# Patient Record
Sex: FEMALE | Race: WHITE | NOT HISPANIC OR LATINO | Employment: UNEMPLOYED | URBAN - NONMETROPOLITAN AREA
[De-identification: names, ages, dates, MRNs, and addresses within clinical notes are randomized per-mention and may not be internally consistent; named-entity substitution may affect disease eponyms.]

---

## 2019-10-16 ENCOUNTER — APPOINTMENT (OUTPATIENT)
Dept: PHARMACY | Facility: HOSPITAL | Age: 29
End: 2019-10-16

## 2019-11-06 ENCOUNTER — OFFICE VISIT (OUTPATIENT)
Dept: PHARMACY | Facility: HOSPITAL | Age: 29
End: 2019-11-06

## 2019-11-06 VITALS
HEIGHT: 63 IN | DIASTOLIC BLOOD PRESSURE: 86 MMHG | WEIGHT: 220 LBS | BODY MASS INDEX: 38.98 KG/M2 | SYSTOLIC BLOOD PRESSURE: 120 MMHG | HEART RATE: 77 BPM | OXYGEN SATURATION: 98 %

## 2019-11-06 DIAGNOSIS — B18.2 CHRONIC HEPATITIS C WITHOUT HEPATIC COMA (HCC): Primary | ICD-10-CM

## 2019-11-06 DIAGNOSIS — F19.91 HISTORY OF DRUG USE: ICD-10-CM

## 2019-11-06 LAB
ALBUMIN SERPL-MCNC: 4.4 G/DL (ref 3.5–5.2)
ALBUMIN/GLOB SERPL: 1.1 G/DL
ALP SERPL-CCNC: 107 U/L (ref 39–117)
ALPHA-FETOPROTEIN: 2.26 NG/ML (ref 0–8.3)
ALT SERPL W P-5'-P-CCNC: 55 U/L (ref 1–33)
ANION GAP SERPL CALCULATED.3IONS-SCNC: 12.6 MMOL/L (ref 5–15)
AST SERPL-CCNC: 28 U/L (ref 1–32)
BILIRUB SERPL-MCNC: 0.3 MG/DL (ref 0.2–1.2)
BUN BLD-MCNC: 12 MG/DL (ref 6–20)
BUN/CREAT SERPL: 15.4 (ref 7–25)
CALCIUM SPEC-SCNC: 9.9 MG/DL (ref 8.6–10.5)
CHLORIDE SERPL-SCNC: 104 MMOL/L (ref 98–107)
CO2 SERPL-SCNC: 22.4 MMOL/L (ref 22–29)
CREAT BLD-MCNC: 0.78 MG/DL (ref 0.57–1)
DEPRECATED RDW RBC AUTO: 42.9 FL (ref 37–54)
ERYTHROCYTE [DISTWIDTH] IN BLOOD BY AUTOMATED COUNT: 12.2 % (ref 12.3–15.4)
GFR SERPL CREATININE-BSD FRML MDRD: 87 ML/MIN/1.73
GLOBULIN UR ELPH-MCNC: 4.1 GM/DL
GLUCOSE BLD-MCNC: 109 MG/DL (ref 65–99)
HBV SURFACE AB SER RIA-ACNC: REACTIVE
HCG SERPL QL: NEGATIVE
HCT VFR BLD AUTO: 42 % (ref 34–46.6)
HGB BLD-MCNC: 14.4 G/DL (ref 12–15.9)
HIV1+2 AB SER QL: NORMAL
INR PPP: 0.91 (ref 0.9–1.1)
MCH RBC QN AUTO: 32.2 PG (ref 26.6–33)
MCHC RBC AUTO-ENTMCNC: 34.3 G/DL (ref 31.5–35.7)
MCV RBC AUTO: 94 FL (ref 79–97)
PLATELET # BLD AUTO: 252 10*3/MM3 (ref 140–450)
PMV BLD AUTO: 10.3 FL (ref 6–12)
POTASSIUM BLD-SCNC: 4.4 MMOL/L (ref 3.5–5.2)
PROT SERPL-MCNC: 8.5 G/DL (ref 6–8.5)
PROTHROMBIN TIME: 12.7 SECONDS (ref 11–15.4)
RBC # BLD AUTO: 4.47 10*6/MM3 (ref 3.77–5.28)
SODIUM BLD-SCNC: 139 MMOL/L (ref 136–145)
TSH SERPL DL<=0.05 MIU/L-ACNC: 1.9 UIU/ML (ref 0.27–4.2)
WBC NRBC COR # BLD: 8.1 10*3/MM3 (ref 3.4–10.8)

## 2019-11-06 PROCEDURE — 86706 HEP B SURFACE ANTIBODY: CPT

## 2019-11-06 PROCEDURE — 82105 ALPHA-FETOPROTEIN SERUM: CPT

## 2019-11-06 PROCEDURE — 80307 DRUG TEST PRSMV CHEM ANLYZR: CPT

## 2019-11-06 PROCEDURE — 86708 HEPATITIS A ANTIBODY: CPT

## 2019-11-06 PROCEDURE — 81596 NFCT DS CHRNC HCV 6 ASSAYS: CPT

## 2019-11-06 PROCEDURE — G0432 EIA HIV-1/HIV-2 SCREEN: HCPCS

## 2019-11-06 PROCEDURE — 99243 OFF/OP CNSLTJ NEW/EST LOW 30: CPT | Performed by: PHYSICIAN ASSISTANT

## 2019-11-06 PROCEDURE — 80050 GENERAL HEALTH PANEL: CPT

## 2019-11-06 PROCEDURE — 87522 HEPATITIS C REVRS TRNSCRPJ: CPT

## 2019-11-06 PROCEDURE — 36415 COLL VENOUS BLD VENIPUNCTURE: CPT

## 2019-11-06 PROCEDURE — 87902 NFCT AGT GNTYP ALYS HEP C: CPT

## 2019-11-06 PROCEDURE — 85610 PROTHROMBIN TIME: CPT

## 2019-11-06 PROCEDURE — G0483 DRUG TEST DEF 22+ CLASSES: HCPCS

## 2019-11-06 PROCEDURE — 86704 HEP B CORE ANTIBODY TOTAL: CPT

## 2019-11-06 PROCEDURE — 84703 CHORIONIC GONADOTROPIN ASSAY: CPT

## 2019-11-06 RX ORDER — BUSPIRONE HYDROCHLORIDE 5 MG/1
TABLET ORAL
Refills: 0 | COMMUNITY
Start: 2019-10-28 | End: 2019-12-11 | Stop reason: SDUPTHER

## 2019-11-06 RX ORDER — AZITHROMYCIN 250 MG/1
TABLET, FILM COATED ORAL
Refills: 0 | COMMUNITY
Start: 2019-11-05 | End: 2020-06-03

## 2019-11-06 RX ORDER — ZIPRASIDONE HYDROCHLORIDE 40 MG/1
CAPSULE ORAL
Refills: 0 | COMMUNITY
Start: 2019-10-28 | End: 2020-06-03

## 2019-11-06 RX ORDER — VENLAFAXINE 75 MG/1
TABLET ORAL
Refills: 0 | COMMUNITY
Start: 2019-10-28 | End: 2020-06-03

## 2019-11-06 RX ORDER — GENTAMICIN SULFATE 3 MG/ML
SOLUTION/ DROPS OPHTHALMIC
Refills: 0 | COMMUNITY
Start: 2019-11-05 | End: 2020-06-03

## 2019-11-06 NOTE — PROGRESS NOTES
: 1990    Chief Complaint   Patient presents with   • Hepatitis C       Portia Hoff is a 29 y.o. female who presents to the office today as a consultation from Chuy Norwood MD for evaluation of Hepatitis C.    History of Present Illness:  She has known about having Hepatitis C since approx . No prior treatment. No current drug use, started using at age 12. Has been clean for a few years now. Does admit previous IVDU. No current alcohol use, previous alcoholism, started age 14. All of her tattoos are non-professional. Her sister has Hep C as well. She does not have any other family history of liver disease, no other personal history of liver disease. She has not had any recent liver imaging.    Review of Systems   Constitutional: Negative for chills, fatigue and fever.   HENT: Negative for trouble swallowing.    Eyes: Negative.    Respiratory: Negative for cough, choking, chest tightness and shortness of breath.    Cardiovascular: Negative for chest pain.   Gastrointestinal: Positive for abdominal distention, diarrhea, nausea and vomiting. Negative for abdominal pain, anal bleeding, blood in stool and constipation.   Endocrine: Negative.    Genitourinary: Negative for difficulty urinating.   Musculoskeletal: Positive for back pain. Negative for neck pain.   Skin: Negative for color change, pallor, rash and wound.   Allergic/Immunologic: Positive for food allergies. Negative for environmental allergies.   Neurological: Positive for headaches. Negative for dizziness and light-headedness.   Hematological: Bruises/bleeds easily.   Psychiatric/Behavioral: Negative.        Past Medical History:   Diagnosis Date   • Anxiety    • Bipolar affective (CMS/HCC)    • Infectious viral hepatitis    • Multiple personalities (CMS/HCC)    • Schizophrenia (CMS/HCC)        Past Surgical History:   Procedure Laterality Date   • LIVER BIOPSY     • UPPER GASTROINTESTINAL ENDOSCOPY         Family History   Problem Relation Age  "of Onset   • Cervical cancer Mother    • Hepatitis Sister    • Cervical cancer Sister        Social History     Socioeconomic History   • Marital status: Unknown     Spouse name: Not on file   • Number of children: Not on file   • Years of education: Not on file   • Highest education level: Not on file   Tobacco Use   • Smoking status: Former Smoker   • Smokeless tobacco: Never Used   Substance and Sexual Activity   • Alcohol use: No     Frequency: Never   • Drug use: No   • Sexual activity: Defer       Current Outpatient Medications:   •  azithromycin (ZITHROMAX) 250 MG tablet, , Disp: , Rfl: 0  •  busPIRone (BUSPAR) 5 MG tablet, , Disp: , Rfl: 0  •  CHANTIX STARTING MONTH ROGER 0.5 MG X 11 & 1 MG X 42 tablet, USE AS DIRECTED PER PACKAGE INSERT, Disp: , Rfl: 0  •  gentamicin (GARAMYCIN) 0.3 % ophthalmic solution, , Disp: , Rfl: 0  •  venlafaxine (EFFEXOR) 75 MG tablet, , Disp: , Rfl: 0  •  ziprasidone (GEODON) 40 MG capsule, , Disp: , Rfl: 0    Allergies:   Mushroom; Shellfish-derived products; Amoxicillin; Flagyl [metronidazole]; Latex; Penicillins; and Vraylar [cariprazine]    Vitals:  /86 (BP Location: Left arm, Patient Position: Sitting, Cuff Size: Adult)   Pulse 77   Ht 160 cm (63\")   Wt 99.8 kg (220 lb)   SpO2 98%   BMI 38.97 kg/m²     Physical Exam   Constitutional: She is oriented to person, place, and time. She appears well-developed and well-nourished. No distress.   Using her cell phone all throughout visit   HENT:   Head: Normocephalic and atraumatic.   Nose: Nose normal.   Mouth/Throat: Oropharynx is clear and moist.   Eyes: Conjunctivae are normal. Right eye exhibits no discharge. Left eye exhibits no discharge. No scleral icterus.   Neck: Normal range of motion. No JVD present.   Cardiovascular: Normal rate, regular rhythm and normal heart sounds. Exam reveals no gallop and no friction rub.   No murmur heard.  Pulmonary/Chest: Effort normal and breath sounds normal. No respiratory distress. " She has no wheezes. She has no rales. She exhibits no tenderness.   Abdominal: Soft. Bowel sounds are normal. She exhibits no mass. There is no tenderness.   Musculoskeletal: Normal range of motion. She exhibits no edema or deformity.   Neurological: She is alert and oriented to person, place, and time. Coordination normal.   Skin: Skin is warm and dry. No rash noted. She is not diaphoretic. No erythema.   tattoos   Psychiatric: Her behavior is normal. Judgment and thought content normal.   Depressed affect, tearful   Vitals reviewed.    Results Review:  10/2019 Labs: ALT 42, AST 31, acute hepatitis panel shows Hep C Ab pos only.    Assessment:  1. Chronic hepatitis C without hepatic coma (CMS/HCC)    2. History of drug use      Plan:  Orders Placed This Encounter   Procedures   • US Liver   • AFP Tumor Marker   • CBC (No Diff)   • Comprehensive Metabolic Panel   • hCG, Serum, Qualitative   • HCV FibroSURE   • HCV RNA By PCR, Qn Rfx Dhara   • Hepatitis A Antibody, Total   • Hepatitis B Core Antibody, Total   • Hepatitis B Surface Antibody   • HIV-1 / O / 2 Ag / Antibody 4th Generation   • Protime-INR   • Compliance Drug Analysis, Ur - Urine, Clean Catch   • TSH     More recommendations will be made after these results have been reviewed. She will continue to abstain from alcohol and illegal drugs. She will have US liver to determine if any lesions or other liver diseases present. Immunity to Hep A and B will be determined and vaccinations recommended if needed. If drug and alcohol screen negative, then we will proceed with Hep C therapy and will submit Rx to insurance company for approval. Treatment will depend on fibrosis score and Hep C genotype. When approved, she will  Rx from our pharmacy and have another appointment with me. Hep C viral load lab will be checked 4 weeks after start of therapy, at end of therapy and 3 months s/p therapy to determine response to treatment and cure. She will call with  concerns.           Return in about 2 weeks (around 11/20/2019) for discussion of results.      Electronically signed 11/6/2019 9:13 AM  Zofia Wray PA-C, [facility]

## 2019-11-07 LAB
HAV AB SER QL IA: NEGATIVE
HBV CORE AB SER DONR QL IA: NEGATIVE

## 2019-11-08 LAB
A2 MACROGLOB SERPL-MCNC: 186 MG/DL (ref 110–276)
ALT SERPL W P-5'-P-CCNC: 64 IU/L (ref 0–40)
APO A-I SERPL-MCNC: 124 MG/DL (ref 116–209)
BILIRUB SERPL-MCNC: 0.3 MG/DL (ref 0–1.2)
FIBROSIS SCORING:: ABNORMAL
FIBROSIS STAGE SERPL QL: ABNORMAL
GGT SERPL-CCNC: 176 IU/L (ref 0–60)
HAPTOGLOB SERPL-MCNC: 224 MG/DL (ref 34–200)
HCV AB SER QL: ABNORMAL
LABORATORY COMMENT REPORT: ABNORMAL
LIMITATIONS:: ABNORMAL
LIVER FIBR SCORE SERPL CALC.FIBROSURE: 0.13 (ref 0–0.21)
NECROINFLAMM ACTIVITY SCORING:: ABNORMAL
NECROINFLAMMATORY ACT GRADE SERPL QL: ABNORMAL
NECROINFLAMMATORY ACT SCORE SERPL: 0.32 (ref 0–0.17)

## 2019-11-09 LAB
HCV GENTYP SERPL NAA+PROBE: NORMAL
HCV GENTYP SERPL NAA+PROBE: NORMAL
HCV RNA SERPL NAA+PROBE-ACNC: NORMAL IU/ML
HCV RNA SERPL NAA+PROBE-LOG IU: 5.55 LOG10 IU/ML
Lab: NORMAL
REF LAB TEST REF RANGE: NORMAL

## 2019-11-11 LAB — CONV REPORT SUMMARY: NORMAL

## 2019-11-20 ENCOUNTER — HOSPITAL ENCOUNTER (OUTPATIENT)
Dept: ULTRASOUND IMAGING | Facility: HOSPITAL | Age: 29
End: 2019-11-20

## 2019-11-20 ENCOUNTER — APPOINTMENT (OUTPATIENT)
Dept: PHARMACY | Facility: HOSPITAL | Age: 29
End: 2019-11-20

## 2019-12-11 ENCOUNTER — HOSPITAL ENCOUNTER (OUTPATIENT)
Dept: ULTRASOUND IMAGING | Facility: HOSPITAL | Age: 29
Discharge: HOME | End: 2019-12-11
Admitting: PHYSICIAN ASSISTANT

## 2019-12-11 ENCOUNTER — OFFICE VISIT (OUTPATIENT)
Dept: PHARMACY | Facility: HOSPITAL | Age: 29
End: 2019-12-11

## 2019-12-11 VITALS
BODY MASS INDEX: 38.98 KG/M2 | SYSTOLIC BLOOD PRESSURE: 119 MMHG | OXYGEN SATURATION: 99 % | DIASTOLIC BLOOD PRESSURE: 84 MMHG | HEART RATE: 76 BPM | WEIGHT: 220 LBS | HEIGHT: 63 IN

## 2019-12-11 DIAGNOSIS — B18.2 CHRONIC HEPATITIS C WITHOUT HEPATIC COMA (HCC): Primary | ICD-10-CM

## 2019-12-11 DIAGNOSIS — F19.91 HISTORY OF DRUG USE: ICD-10-CM

## 2019-12-11 DIAGNOSIS — B18.2 CHRONIC HEPATITIS C WITHOUT HEPATIC COMA (HCC): ICD-10-CM

## 2019-12-11 DIAGNOSIS — Z23 NEED FOR HEPATITIS A IMMUNIZATION: ICD-10-CM

## 2019-12-11 PROCEDURE — 76705 ECHO EXAM OF ABDOMEN: CPT

## 2019-12-11 PROCEDURE — 76705 ECHO EXAM OF ABDOMEN: CPT | Performed by: RADIOLOGY

## 2019-12-11 PROCEDURE — 99214 OFFICE O/P EST MOD 30 MIN: CPT | Performed by: PHYSICIAN ASSISTANT

## 2019-12-11 RX ORDER — CYCLOBENZAPRINE HCL 10 MG
TABLET ORAL
Refills: 0 | COMMUNITY
Start: 2019-12-02 | End: 2020-06-03

## 2019-12-11 RX ORDER — BUSPIRONE HYDROCHLORIDE 10 MG/1
TABLET ORAL
Refills: 0 | COMMUNITY
Start: 2019-12-05 | End: 2020-06-03

## 2019-12-11 NOTE — PROGRESS NOTES
: 1990    Chief Complaint   Patient presents with   • Hepatitis C       Portia Hoff is a 29 y.o. female who presents to the office today as a follow up appointment regarding Hepatitis C.    History of Present Illness:  She would like to discuss her recent results. She has known about having Hepatitis C since approx . No prior treatment. No current drug use, started using at age 12. Has been clean for a few years now. Does admit previous IVDU. No current alcohol use, previous alcoholism, started age 14. All of her tattoos are non-professional. Her sister has Hep C as well. She does not have any other family history of liver disease, no other personal history of liver disease.     Review of Systems   Constitutional: Negative for chills, fatigue and fever.   HENT: Negative for trouble swallowing.    Eyes: Negative.    Respiratory: Negative for cough, choking, chest tightness and shortness of breath.    Cardiovascular: Negative for chest pain.   Gastrointestinal: Negative for abdominal distention, abdominal pain, anal bleeding, blood in stool, constipation, diarrhea, nausea and vomiting.   Endocrine: Negative.    Genitourinary: Negative for difficulty urinating.   Musculoskeletal: Positive for back pain. Negative for neck pain.   Skin: Negative for color change, pallor, rash and wound.   Allergic/Immunologic: Positive for food allergies. Negative for environmental allergies.   Neurological: Positive for headaches. Negative for dizziness and light-headedness.   Hematological: Bruises/bleeds easily.   Psychiatric/Behavioral: Negative.      I have reviewed the ROS as documented by the MA/LPN/RN Zofia Wray PA-C    Past Medical History:   Diagnosis Date   • Anxiety    • Bipolar affective (CMS/HCC)    • Infectious viral hepatitis    • Multiple personalities (CMS/HCC)    • Schizophrenia (CMS/HCC)        Past Surgical History:   Procedure Laterality Date   • LIVER BIOPSY     • UPPER GASTROINTESTINAL ENDOSCOPY    "      Family History   Problem Relation Age of Onset   • Cervical cancer Mother    • Hepatitis Sister    • Cervical cancer Sister        Social History     Socioeconomic History   • Marital status: Unknown     Spouse name: Not on file   • Number of children: Not on file   • Years of education: Not on file   • Highest education level: Not on file   Tobacco Use   • Smoking status: Former Smoker   • Smokeless tobacco: Never Used   Substance and Sexual Activity   • Alcohol use: No     Frequency: Never   • Drug use: No   • Sexual activity: Defer       Current Outpatient Medications:   •  azithromycin (ZITHROMAX) 250 MG tablet, , Disp: , Rfl: 0  •  busPIRone (BUSPAR) 10 MG tablet, , Disp: , Rfl: 0  •  CHANTIX STARTING MONTH ROGER 0.5 MG X 11 & 1 MG X 42 tablet, USE AS DIRECTED PER PACKAGE INSERT, Disp: , Rfl: 0  •  gentamicin (GARAMYCIN) 0.3 % ophthalmic solution, , Disp: , Rfl: 0  •  venlafaxine (EFFEXOR) 75 MG tablet, , Disp: , Rfl: 0  •  ziprasidone (GEODON) 40 MG capsule, , Disp: , Rfl: 0  •  cyclobenzaprine (FLEXERIL) 10 MG tablet, , Disp: , Rfl: 0    Allergies:   Mushroom; Shellfish-derived products; Amoxicillin; Flagyl [metronidazole]; Latex; Penicillins; and Vraylar [cariprazine]    Vitals:  /84   Pulse 76   Ht 160 cm (63\")   Wt 99.8 kg (220 lb)   SpO2 99%   BMI 38.97 kg/m²     Physical Exam   Constitutional: She is oriented to person, place, and time. She appears well-developed and well-nourished. No distress.   HENT:   Head: Normocephalic and atraumatic.   Nose: Nose normal.   Mouth/Throat: Oropharynx is clear and moist.   Eyes: Conjunctivae are normal. Right eye exhibits no discharge. Left eye exhibits no discharge. No scleral icterus.   Neck: Normal range of motion. No JVD present.   Cardiovascular: Normal rate, regular rhythm and normal heart sounds. Exam reveals no gallop and no friction rub.   No murmur heard.  Pulmonary/Chest: Effort normal and breath sounds normal. No respiratory distress. She " has no wheezes. She has no rales. She exhibits no tenderness.   Abdominal: Soft. Bowel sounds are normal. She exhibits no mass. There is no tenderness.   Musculoskeletal: Normal range of motion. She exhibits no edema or deformity.   Neurological: She is alert and oriented to person, place, and time. Coordination normal.   Skin: Skin is warm and dry. No rash noted. She is not diaphoretic. No erythema.   tattoos   Psychiatric: She has a normal mood and affect. Her behavior is normal. Judgment and thought content normal.   Mood improved   Vitals reviewed.    Results Review:  Labs 11/6/2019: Hepatitis C genotype 1a, AFP normal, CBC normal, CMP with ALT elevated 55, pregnancy negative, F0 fibrosis score, A1 inflammation, hepatitis C viral load 355,000, hepatitis A total antibody negative, hepatitis B core total antibody negative, hepatitis B surface antibody positive, HIV negative, INR normal, drug screen negative, TSH normal.      Ultrasound liver 12/11/2019 normal.    10/2019 Labs: ALT 42, AST 31, acute hepatitis panel shows Hep C Ab pos only.    Assessment:  1. Chronic hepatitis C without hepatic coma (CMS/HCC)    2. Need for hepatitis A immunization      Plan:  Rx- Mavyret, Glecaprevir-Pibrentasvir 100-MG tablet, 3 tabs p.o. once daily for 8 weeks for treatment of chronic hepatitis C infection.  She will continue to abstain from alcohol and illicit drug use.  She will return for follow-up and labs in 4 weeks for monitoring of therapy.  More labs to be completed at completion of therapy and 3-month status post completion of therapy for confirmation of cure.  She needs hepatitis A vaccination for immunity which was discussed.        Return in about 1 month (around 1/11/2020) for recheck Hep C.      Electronically signed 12/18/2019 2:14 PM  Zofia Wray PA-C, [facility]

## 2019-12-16 ENCOUNTER — SPECIALTY PHARMACY (OUTPATIENT)
Dept: PHARMACY | Facility: HOSPITAL | Age: 29
End: 2019-12-16

## 2019-12-16 ENCOUNTER — TELEPHONE (OUTPATIENT)
Dept: GASTROENTEROLOGY | Facility: CLINIC | Age: 29
End: 2019-12-16

## 2019-12-16 DIAGNOSIS — B18.2 CHRONIC HEPATITIS C WITHOUT HEPATIC COMA (HCC): ICD-10-CM

## 2019-12-16 DIAGNOSIS — B18.2 CHRONIC HEPATITIS C WITHOUT HEPATIC COMA (HCC): Primary | ICD-10-CM

## 2019-12-16 NOTE — TELEPHONE ENCOUNTER
Pt has Hepatitis C, genotype 1a, treatment naive, without cirrhosis (F0). I have prescribed Mavyret.

## 2020-01-23 ENCOUNTER — SPECIALTY PHARMACY (OUTPATIENT)
Dept: PHARMACY | Facility: HOSPITAL | Age: 30
End: 2020-01-23

## 2020-01-29 ENCOUNTER — OFFICE VISIT (OUTPATIENT)
Dept: PHARMACY | Facility: HOSPITAL | Age: 30
End: 2020-01-29

## 2020-01-29 VITALS
DIASTOLIC BLOOD PRESSURE: 88 MMHG | HEART RATE: 80 BPM | OXYGEN SATURATION: 100 % | SYSTOLIC BLOOD PRESSURE: 144 MMHG | HEIGHT: 63 IN | BODY MASS INDEX: 38.98 KG/M2 | WEIGHT: 220 LBS

## 2020-01-29 DIAGNOSIS — B18.2 HEP C W/O COMA, CHRONIC (HCC): ICD-10-CM

## 2020-01-29 DIAGNOSIS — B18.2 CHRONIC HEPATITIS C WITHOUT HEPATIC COMA (HCC): Primary | ICD-10-CM

## 2020-01-29 LAB
ALBUMIN SERPL-MCNC: 4.3 G/DL (ref 3.5–5.2)
ALBUMIN/GLOB SERPL: 1.4 G/DL
ALP SERPL-CCNC: 110 U/L (ref 39–117)
ALT SERPL W P-5'-P-CCNC: 18 U/L (ref 1–33)
ANION GAP SERPL CALCULATED.3IONS-SCNC: 15.3 MMOL/L (ref 5–15)
AST SERPL-CCNC: 26 U/L (ref 1–32)
BILIRUB SERPL-MCNC: 0.4 MG/DL (ref 0.2–1.2)
BUN BLD-MCNC: 14 MG/DL (ref 6–20)
BUN/CREAT SERPL: 17.7 (ref 7–25)
CALCIUM SPEC-SCNC: 9.3 MG/DL (ref 8.6–10.5)
CHLORIDE SERPL-SCNC: 100 MMOL/L (ref 98–107)
CO2 SERPL-SCNC: 22.7 MMOL/L (ref 22–29)
CREAT BLD-MCNC: 0.79 MG/DL (ref 0.57–1)
GFR SERPL CREATININE-BSD FRML MDRD: 85 ML/MIN/1.73
GLOBULIN UR ELPH-MCNC: 3.1 GM/DL
GLUCOSE BLD-MCNC: 116 MG/DL (ref 65–99)
POTASSIUM BLD-SCNC: 3.8 MMOL/L (ref 3.5–5.2)
PROT SERPL-MCNC: 7.4 G/DL (ref 6–8.5)
SODIUM BLD-SCNC: 138 MMOL/L (ref 136–145)

## 2020-01-29 PROCEDURE — 87522 HEPATITIS C REVRS TRNSCRPJ: CPT

## 2020-01-29 PROCEDURE — 80053 COMPREHEN METABOLIC PANEL: CPT

## 2020-01-29 PROCEDURE — 99214 OFFICE O/P EST MOD 30 MIN: CPT | Performed by: PHYSICIAN ASSISTANT

## 2020-01-29 PROCEDURE — 36415 COLL VENOUS BLD VENIPUNCTURE: CPT

## 2020-01-29 NOTE — PROGRESS NOTES
: 1990    Chief Complaint   Patient presents with   • Hepatitis C       Portia Hoff is a 29 y.o. female who presents to the office today as a follow up appointment regarding Hepatitis C.    History of Present Illness:  She has been taking Mavyret for the past 4-5 weeks now for treatment of Hepatitis C. She had some mild side effects of fatigue and dizziness at the beginning of therapy but they have resolved now. She is not using illicit drugs or drinking alcohol. She had Hepatitis A vaccination as recommended, first vaccine in series approx 6 weeks ago.     She has known about having Hepatitis C since approx . No prior treatment. Started using drugs at age 12. Has been clean for a few years now. Does admit previous IVDU. No current alcohol use, previous alcoholism, started drinking age 14. All of her tattoos are non-professional. Her sister has Hep C as well. She does not have any other family history of liver disease, no other personal history of liver disease.     Review of Systems   Constitutional: Negative for chills, fatigue and fever.   HENT: Negative for trouble swallowing.    Eyes: Negative.    Respiratory: Negative for cough, choking, chest tightness and shortness of breath.    Cardiovascular: Negative for chest pain.   Gastrointestinal: Negative for abdominal distention, abdominal pain, anal bleeding, blood in stool, constipation, diarrhea, nausea and vomiting.   Endocrine: Negative.    Genitourinary: Negative for difficulty urinating.   Musculoskeletal: Positive for back pain. Negative for neck pain.   Skin: Negative for color change, pallor, rash and wound.   Allergic/Immunologic: Positive for food allergies. Negative for environmental allergies.   Neurological: Positive for headaches. Negative for dizziness and light-headedness.   Hematological: Bruises/bleeds easily.   Psychiatric/Behavioral: Negative.      I have reviewed and confirmed the accuracy of the ROS as documented by the  "MA/GOVIND/RN Zofia Wray PA-C    Past Medical History:   Diagnosis Date   • Anxiety    • Bipolar affective (CMS/HCC)    • Infectious viral hepatitis    • Multiple personalities (CMS/HCC)    • Schizophrenia (CMS/HCC)        Past Surgical History:   Procedure Laterality Date   • LIVER BIOPSY     • UPPER GASTROINTESTINAL ENDOSCOPY         Family History   Problem Relation Age of Onset   • Cervical cancer Mother    • Hepatitis Sister    • Cervical cancer Sister        Social History     Socioeconomic History   • Marital status: Unknown     Spouse name: Not on file   • Number of children: Not on file   • Years of education: Not on file   • Highest education level: Not on file   Tobacco Use   • Smoking status: Former Smoker   • Smokeless tobacco: Never Used   Substance and Sexual Activity   • Alcohol use: No     Frequency: Never   • Drug use: No   • Sexual activity: Defer     Current Outpatient Medications:   •  azithromycin (ZITHROMAX) 250 MG tablet, , Disp: , Rfl: 0  •  busPIRone (BUSPAR) 10 MG tablet, , Disp: , Rfl: 0  •  CHANTIX STARTING MONTH ROGER 0.5 MG X 11 & 1 MG X 42 tablet, USE AS DIRECTED PER PACKAGE INSERT, Disp: , Rfl: 0  •  cyclobenzaprine (FLEXERIL) 10 MG tablet, , Disp: , Rfl: 0  •  gentamicin (GARAMYCIN) 0.3 % ophthalmic solution, , Disp: , Rfl: 0  •  Glecaprevir-Pibrentasvir (MAVYRET) 100-40 MG tablet, Take 3 tablets by mouth Daily for 56 days., Disp: 84 tablet, Rfl: 1  •  venlafaxine (EFFEXOR) 75 MG tablet, , Disp: , Rfl: 0  •  ziprasidone (GEODON) 40 MG capsule, , Disp: , Rfl: 0    Allergies:   Mushroom; Shellfish-derived products; Amoxicillin; Flagyl [metronidazole]; Latex; Penicillins; and Vraylar [cariprazine]    Vitals:  /88 (BP Location: Left arm, Patient Position: Sitting, Cuff Size: Adult)   Pulse 80   Ht 160 cm (63\")   Wt 99.8 kg (220 lb)   SpO2 100%   BMI 38.97 kg/m²     Physical Exam   Constitutional: She is oriented to person, place, and time. She appears well-developed and " well-nourished. No distress.   HENT:   Head: Normocephalic and atraumatic.   Nose: Nose normal.   Mouth/Throat: Oropharynx is clear and moist.   Eyes: Conjunctivae are normal. Right eye exhibits no discharge. Left eye exhibits no discharge. No scleral icterus.   Neck: Normal range of motion. No JVD present.   Pulmonary/Chest: Effort normal. No respiratory distress.   Musculoskeletal: Normal range of motion. She exhibits no edema or deformity.   Neurological: She is alert and oriented to person, place, and time. Coordination normal.   Skin: No rash noted. She is not diaphoretic. No erythema.   Psychiatric: She has a normal mood and affect. Her behavior is normal. Judgment and thought content normal.   Vitals reviewed.    Results Review:  Labs 11/6/2019: Hepatitis C genotype 1a, AFP normal, CBC normal, CMP with ALT elevated 55, pregnancy negative, F0 fibrosis score, A1 inflammation, hepatitis C viral load 355,000, hepatitis A total antibody negative, hepatitis B core total antibody negative, hepatitis B surface antibody positive, HIV negative, INR normal, drug screen negative, TSH normal.       Ultrasound liver 12/11/2019 normal.     10/2019 Labs: ALT 42, AST 31, acute hepatitis panel shows Hep C Ab pos only.    Assessment:  1. Chronic hepatitis C without hepatic coma (CMS/HCC)      Plan:  Orders Placed This Encounter   Procedures   • Hepatitis C RNA, Quantitative, PCR (graph)   • Comprehensive Metabolic Panel     Continue taking Mavyret, Glecaprevir-Pibrentasvir 100-MG tablet, 3 tabs p.o. once daily for total duration of therapy of 8 weeks for treatment of chronic hepatitis C infection.  She will continue to abstain from alcohol and illicit drug use.  She will return for follow-up and labs at completion of therapy and 3-month status post completion of therapy for confirmation of cure. She will be called with results.        Follow up after final labs are completed.      Electronically signed 1/29/2020 9:27 AM  Zofia  ZA Wray, [facility]

## 2020-01-31 LAB
HCV RNA SERPL NAA+PROBE-ACNC: NORMAL IU/ML
TEST INFORMATION: NORMAL

## 2020-02-03 ENCOUNTER — TELEPHONE (OUTPATIENT)
Dept: GASTROENTEROLOGY | Facility: CLINIC | Age: 30
End: 2020-02-03

## 2020-02-03 NOTE — TELEPHONE ENCOUNTER
Please let patient know that hepatitis C viral load not detected at 4-week labs and liver enzymes normal.  Continue with plan for repeat labs at completion of therapy.

## 2020-02-03 NOTE — TELEPHONE ENCOUNTER
Spoke with patient this morning and made her aware of her lab results.  Patient very pleased.  End of treatment labs are scheduled for 2/19/20.  Thanks!

## 2020-02-07 NOTE — PROGRESS NOTES
Specialty Pharmacy Note      Name:  Portia Hoff  :  1990  Date:  2020         Past Medical History:   Diagnosis Date   • Anxiety    • Bipolar affective (CMS/HCC)    • Infectious viral hepatitis    • Multiple personalities (CMS/HCC)    • Schizophrenia (CMS/HCC)        Past Surgical History:   Procedure Laterality Date   • LIVER BIOPSY     • UPPER GASTROINTESTINAL ENDOSCOPY         Social History     Socioeconomic History   • Marital status: Unknown     Spouse name: Not on file   • Number of children: Not on file   • Years of education: Not on file   • Highest education level: Not on file   Tobacco Use   • Smoking status: Former Smoker   • Smokeless tobacco: Never Used   Substance and Sexual Activity   • Alcohol use: No     Frequency: Never   • Drug use: No   • Sexual activity: Defer       Family History   Problem Relation Age of Onset   • Cervical cancer Mother    • Hepatitis Sister    • Cervical cancer Sister        Allergies   Allergen Reactions   • Mushroom Anaphylaxis   • Shellfish-Derived Products Anaphylaxis   • Amoxicillin Hives   • Flagyl [Metronidazole] Other (See Comments)     Thrush     • Latex Hives     Blisters     • Penicillins Hives   • Vraylar [Cariprazine] Other (See Comments)     Suicidal thoughts        Current Outpatient Medications   Medication Sig Dispense Refill   • azithromycin (ZITHROMAX) 250 MG tablet   0   • busPIRone (BUSPAR) 10 MG tablet   0   • CHANTIX STARTING MONTH ROGER 0.5 MG X 11 & 1 MG X 42 tablet USE AS DIRECTED PER PACKAGE INSERT  0   • cyclobenzaprine (FLEXERIL) 10 MG tablet   0   • gentamicin (GARAMYCIN) 0.3 % ophthalmic solution   0   • Glecaprevir-Pibrentasvir (MAVYRET) 100-40 MG tablet Take 3 tablets by mouth Daily for 56 days. 84 tablet 1   • venlafaxine (EFFEXOR) 75 MG tablet   0   • ziprasidone (GEODON) 40 MG capsule   0     No current facility-administered medications for this visit.          LABORATORY:    Lab Results   Component Value Date    TSH  1.900 11/06/2019    PROTIME 12.7 11/06/2019    INR 0.91 11/06/2019     Lab Results   Component Value Date    PROTIME 12.7 11/06/2019    INR 0.91 11/06/2019     Lab Results   Component Value Date    HAV Negative 11/06/2019    HCVQUANT 044901 11/06/2019    XXOPOX44 5.550 11/06/2019    HCVINFO Comment 11/06/2019    HCVGENOTYPE Comment 11/06/2019    HCVGENOTYPE 1a 11/06/2019     Lab Results   Component Value Date    HEPBSAB Reactive (A) 11/06/2019     Last Urine Toxicity     There is no flowsheet data to display.          ASSESSMENT/PLAN:    Patient Update Assessment (new medications, allergies, medical history): No changes.     Medication(s): Mavyret 100-40 mg tablets for treatment of Hepatitis C.     Currently Taking Medication(s): Patient reports taking medication as directed, 3 tablets by mouth daily.    Effectiveness of Medication: Will be determined with 4 week, end of treatment, and 3 month s/p treatment labs as ordered by patient's provider. (See Labs tab in patient chart)     Experiencing Side Effects: None expressed, patient tolerating well.    Prior Authorization Status: Approved.     Financial Assistance Status: Assistance is not needed at this time.    Any Issues Identified: No issues expressed from patient, no issues processing medication refill.     Appropriate to Process Prescription(s): Yes.  Medication will be dispensed from [facility] and delivered to patient via [employer] overnight delivery services.     Counseling Offered: Patient was counseled with initial fill.  She is aware to contact pharmacy and/or clinic with any new questions or concerns.     Next Specialty Pharmacy Visit: Specialty visit will not be needed due to this refill being the last four weeks of an eight week medication regimen.  Specialty Pharmacy can follow as needed.

## 2020-02-19 ENCOUNTER — LAB (OUTPATIENT)
Dept: PHARMACY | Facility: HOSPITAL | Age: 30
End: 2020-02-19

## 2020-02-19 DIAGNOSIS — B18.2 CHRONIC HEPATITIS C WITHOUT HEPATIC COMA (HCC): ICD-10-CM

## 2020-02-19 PROCEDURE — 87522 HEPATITIS C REVRS TRNSCRPJ: CPT

## 2020-02-19 PROCEDURE — 80053 COMPREHEN METABOLIC PANEL: CPT

## 2020-02-19 PROCEDURE — 36415 COLL VENOUS BLD VENIPUNCTURE: CPT

## 2020-02-20 LAB
ALBUMIN SERPL-MCNC: 4.5 G/DL (ref 3.5–5.2)
ALBUMIN/GLOB SERPL: 1.4 G/DL
ALP SERPL-CCNC: 105 U/L (ref 39–117)
ALT SERPL W P-5'-P-CCNC: 16 U/L (ref 1–33)
ANION GAP SERPL CALCULATED.3IONS-SCNC: 10.7 MMOL/L (ref 5–15)
AST SERPL-CCNC: 18 U/L (ref 1–32)
BILIRUB SERPL-MCNC: 0.3 MG/DL (ref 0.2–1.2)
BUN BLD-MCNC: 13 MG/DL (ref 6–20)
BUN/CREAT SERPL: 14.8 (ref 7–25)
CALCIUM SPEC-SCNC: 9.3 MG/DL (ref 8.6–10.5)
CHLORIDE SERPL-SCNC: 101 MMOL/L (ref 98–107)
CO2 SERPL-SCNC: 24.3 MMOL/L (ref 22–29)
CREAT BLD-MCNC: 0.88 MG/DL (ref 0.57–1)
GFR SERPL CREATININE-BSD FRML MDRD: 75 ML/MIN/1.73
GLOBULIN UR ELPH-MCNC: 3.2 GM/DL
GLUCOSE BLD-MCNC: 133 MG/DL (ref 65–99)
POTASSIUM BLD-SCNC: 4.1 MMOL/L (ref 3.5–5.2)
PROT SERPL-MCNC: 7.7 G/DL (ref 6–8.5)
SODIUM BLD-SCNC: 136 MMOL/L (ref 136–145)

## 2020-02-21 LAB
HCV RNA SERPL NAA+PROBE-ACNC: NORMAL IU/ML
TEST INFORMATION: NORMAL

## 2020-02-24 ENCOUNTER — TELEPHONE (OUTPATIENT)
Dept: GASTROENTEROLOGY | Facility: CLINIC | Age: 30
End: 2020-02-24

## 2020-02-24 NOTE — TELEPHONE ENCOUNTER
Pt's end of tx labs show HCV not detected and liver enzymes normal. Please let her know. Continue with plan to have labs again 3 mo s/p completion of therapy then f/u 1 week after.

## 2020-04-20 NOTE — PROGRESS NOTES
Specialty Pharmacy Note      Name:  Portia Hoff  :  1990  Date:  2019         Past Medical History:   Diagnosis Date   • Anxiety    • Bipolar affective (CMS/HCC)    • Infectious viral hepatitis    • Multiple personalities (CMS/HCC)    • Schizophrenia (CMS/HCC)        Past Surgical History:   Procedure Laterality Date   • LIVER BIOPSY     • UPPER GASTROINTESTINAL ENDOSCOPY         Social History     Socioeconomic History   • Marital status: Unknown     Spouse name: Not on file   • Number of children: Not on file   • Years of education: Not on file   • Highest education level: Not on file   Tobacco Use   • Smoking status: Former Smoker   • Smokeless tobacco: Never Used   Substance and Sexual Activity   • Alcohol use: No     Frequency: Never   • Drug use: No   • Sexual activity: Defer       Family History   Problem Relation Age of Onset   • Cervical cancer Mother    • Hepatitis Sister    • Cervical cancer Sister        Allergies   Allergen Reactions   • Mushroom Anaphylaxis   • Shellfish-Derived Products Anaphylaxis   • Amoxicillin Hives   • Flagyl [Metronidazole] Other (See Comments)     Thrush     • Latex Hives     Blisters     • Penicillins Hives   • Vraylar [Cariprazine] Other (See Comments)     Suicidal thoughts        Current Outpatient Medications   Medication Sig Dispense Refill   • azithromycin (ZITHROMAX) 250 MG tablet   0   • busPIRone (BUSPAR) 10 MG tablet   0   • CHANTIX STARTING MONTH ROGER 0.5 MG X 11 & 1 MG X 42 tablet USE AS DIRECTED PER PACKAGE INSERT  0   • cyclobenzaprine (FLEXERIL) 10 MG tablet   0   • gentamicin (GARAMYCIN) 0.3 % ophthalmic solution   0   • venlafaxine (EFFEXOR) 75 MG tablet   0   • ziprasidone (GEODON) 40 MG capsule   0     No current facility-administered medications for this visit.          LABORATORY:    Lab Results   Component Value Date    TSH 1.900 2019    PROTIME 12.7 2019    INR 0.91 2019     Lab Results   Component Value Date     PROTIME 12.7 11/06/2019    INR 0.91 11/06/2019     Lab Results   Component Value Date    HAV Negative 11/06/2019    HCVQUANT 672507 11/06/2019    SANIXU30 5.550 11/06/2019    HCVINFO Comment 11/06/2019    HCVGENOTYPE Comment 11/06/2019    HCVGENOTYPE 1a 11/06/2019     Lab Results   Component Value Date    HEPBSAB Reactive (A) 11/06/2019     Last Urine Toxicity     There is no flowsheet data to display.          ASSESSMENT/PLAN:    Patient Update Assessment (new medications, allergies, medical history): Updated     Medication(s): Mavyret for 8 weeks for treatment of Hepatitis C     Currently Taking Medication(s): New Start     Effectiveness of Medication: N/A     Experiencing Side Effects: N/A     Prior Authorization Status: Was obtained with insurance     Financial Assistance Status: None needed at this time     Any Issues Identified: None     Appropriate to Process Prescription(s): Yes, after chart review and patient discussion, assessment was ok to process at [facility] pharmacy.     Counseling Offered: Yes, patient was counseled using medication guide focusing on administration instructions, importance of adherence, common/serious side effects, etc.

## 2020-05-27 ENCOUNTER — LAB (OUTPATIENT)
Dept: PHARMACY | Facility: HOSPITAL | Age: 30
End: 2020-05-27

## 2020-05-27 DIAGNOSIS — B18.2 CHRONIC HEPATITIS C WITHOUT HEPATIC COMA (HCC): Primary | ICD-10-CM

## 2020-05-27 PROCEDURE — 87522 HEPATITIS C REVRS TRNSCRPJ: CPT

## 2020-05-27 PROCEDURE — 80053 COMPREHEN METABOLIC PANEL: CPT

## 2020-05-27 PROCEDURE — 36415 COLL VENOUS BLD VENIPUNCTURE: CPT

## 2020-05-28 LAB
ALBUMIN SERPL-MCNC: 4.5 G/DL (ref 3.5–5.2)
ALBUMIN/GLOB SERPL: 1.3 G/DL
ALP SERPL-CCNC: 84 U/L (ref 39–117)
ALT SERPL W P-5'-P-CCNC: 26 U/L (ref 1–33)
ANION GAP SERPL CALCULATED.3IONS-SCNC: 14.7 MMOL/L (ref 5–15)
AST SERPL-CCNC: 21 U/L (ref 1–32)
BILIRUB SERPL-MCNC: 0.4 MG/DL (ref 0.2–1.2)
BUN BLD-MCNC: 9 MG/DL (ref 6–20)
BUN/CREAT SERPL: 9.4 (ref 7–25)
CALCIUM SPEC-SCNC: 10 MG/DL (ref 8.6–10.5)
CHLORIDE SERPL-SCNC: 100 MMOL/L (ref 98–107)
CO2 SERPL-SCNC: 23.3 MMOL/L (ref 22–29)
CREAT BLD-MCNC: 0.96 MG/DL (ref 0.57–1)
GFR SERPL CREATININE-BSD FRML MDRD: 68 ML/MIN/1.73
GLOBULIN UR ELPH-MCNC: 3.4 GM/DL
GLUCOSE BLD-MCNC: 111 MG/DL (ref 65–99)
POTASSIUM BLD-SCNC: 4.2 MMOL/L (ref 3.5–5.2)
PROT SERPL-MCNC: 7.9 G/DL (ref 6–8.5)
SODIUM BLD-SCNC: 138 MMOL/L (ref 136–145)

## 2020-06-03 ENCOUNTER — OFFICE VISIT (OUTPATIENT)
Dept: PHARMACY | Facility: HOSPITAL | Age: 30
End: 2020-06-03

## 2020-06-03 VITALS
HEIGHT: 63 IN | WEIGHT: 220 LBS | DIASTOLIC BLOOD PRESSURE: 80 MMHG | HEART RATE: 87 BPM | OXYGEN SATURATION: 96 % | BODY MASS INDEX: 38.98 KG/M2 | SYSTOLIC BLOOD PRESSURE: 123 MMHG

## 2020-06-03 DIAGNOSIS — Z86.19 HISTORY OF HEPATITIS C: Primary | ICD-10-CM

## 2020-06-03 LAB
HCV RNA SERPL NAA+PROBE-ACNC: NORMAL IU/ML
TEST INFORMATION: NORMAL

## 2020-06-03 PROCEDURE — 99213 OFFICE O/P EST LOW 20 MIN: CPT | Performed by: PHYSICIAN ASSISTANT

## 2020-06-03 RX ORDER — TRAZODONE HYDROCHLORIDE 50 MG/1
TABLET ORAL
COMMUNITY
Start: 2020-03-19

## 2020-06-03 NOTE — PROGRESS NOTES
: 1990    Chief Complaint   Patient presents with   • Hepatitis C       Portia Hoff is a 29 y.o. female who presents to the office today as a follow up appointment regarding Hepatitis C.    History of Present Illness:  Feeling great, no GI complaints today. She would like to discuss her recent lab results. She completed Hepatitis C therapy with Mavyret as prescribed for 8 weeks, completed treatment just over 3 months ago.     She is not using illicit drugs. Did drink alcohol during the holiday but has not drank since and never drinks daily or even often. She had Hepatitis A vaccination as recommended, first vaccine in series and plans to get next as scheduled. She has known about having Hepatitis C since approx . No prior treatment. Started using drugs at age 12. Has been clean for a few years now. Does admit previous IVDU. No current alcohol use, previous alcoholism, started drinking age 14. All of her tattoos are non-professional. Her sister has Hep C as well. She does not have any other family history of liver disease, no other personal history of liver disease.     Review of Systems   Constitutional: Negative for chills, fatigue and fever.   HENT: Negative for trouble swallowing.    Eyes: Negative.    Respiratory: Negative for cough, choking, chest tightness and shortness of breath.    Cardiovascular: Negative for chest pain.   Gastrointestinal: Negative for abdominal distention, abdominal pain, anal bleeding, blood in stool, constipation, diarrhea, nausea and vomiting.   Endocrine: Negative.    Genitourinary: Negative for difficulty urinating.   Musculoskeletal: Positive for back pain. Negative for neck pain.   Skin: Negative for color change, pallor, rash and wound.   Allergic/Immunologic: Positive for food allergies. Negative for environmental allergies.   Neurological: Positive for headaches. Negative for dizziness and light-headedness.   Hematological: Bruises/bleeds easily.  "  Psychiatric/Behavioral: Negative.      I have reviewed and confirmed the accuracy of the ROS as documented by the MA/LPN/RN Zofia Wray PA-C    Past Medical History:   Diagnosis Date   • Anxiety    • Bipolar affective (CMS/HCC)    • Infectious viral hepatitis    • Multiple personalities (CMS/HCC)    • Schizophrenia (CMS/HCC)        Past Surgical History:   Procedure Laterality Date   • LIVER BIOPSY     • UPPER GASTROINTESTINAL ENDOSCOPY         Family History   Problem Relation Age of Onset   • Cervical cancer Mother    • Hepatitis Sister    • Cervical cancer Sister        Social History     Socioeconomic History   • Marital status: Unknown     Spouse name: Not on file   • Number of children: Not on file   • Years of education: Not on file   • Highest education level: Not on file   Tobacco Use   • Smoking status: Former Smoker   • Smokeless tobacco: Never Used   Substance and Sexual Activity   • Alcohol use: No     Frequency: Never   • Drug use: No   • Sexual activity: Defer       Current Outpatient Medications:   •  sertraline (ZOLOFT) 50 MG tablet, , Disp: , Rfl:   •  traZODone (DESYREL) 50 MG tablet, , Disp: , Rfl:     Allergies:   Mushroom; Shellfish-derived products; Amoxicillin; Flagyl [metronidazole]; Latex; Penicillins; and Vraylar [cariprazine]    Vitals:  /80 (BP Location: Left arm, Patient Position: Sitting, Cuff Size: Adult)   Pulse 87   Ht 160 cm (63\")   Wt 99.8 kg (220 lb)   SpO2 96%   BMI 38.97 kg/m²     Physical Exam   Constitutional: She is oriented to person, place, and time. She appears well-developed and well-nourished. No distress.   HENT:   Head: Normocephalic and atraumatic.   Right Ear: External ear normal.   Left Ear: External ear normal.   Wearing a mask, covering mouth only   Eyes: Conjunctivae and EOM are normal. Right eye exhibits no discharge. Left eye exhibits no discharge. No scleral icterus.   Neck: Normal range of motion. No tracheal deviation present. "   Pulmonary/Chest: Effort normal. No respiratory distress.   Musculoskeletal: Normal range of motion. She exhibits no edema.   Neurological: She is alert and oriented to person, place, and time. Coordination normal.   Skin: No rash noted. She is not diaphoretic. No erythema. No pallor.   Psychiatric: She has a normal mood and affect. Her behavior is normal. Judgment and thought content normal.     Results Review:  Labs 5/27/2020: HCV not detected, CMP normal  Labs 2/19/2020: HCV not detected, CMP normal  Labs 1/29/2020: HCV not detected, CMP normal    Labs 11/6/2019: Hepatitis C genotype 1a, AFP normal, CBC normal, CMP with ALT elevated 55, pregnancy negative, F0 fibrosis score, A1 inflammation, hepatitis C viral load 355,000, hepatitis A total antibody negative, hepatitis B core total antibody negative, hepatitis B surface antibody positive, HIV negative, INR normal, drug screen negative, TSH normal.       Ultrasound liver 12/11/2019 normal.     10/2019 Labs: ALT 42, AST 31, acute hepatitis panel shows Hep C Ab pos only.    Assessment:  1. History of hepatitis C      Plan:  Her lab results were discussed in detail with her today. She has been cured of chronic Hepatitis C infection with Mavyret as prescribed. She has been educated that Hepatitis C will not come back on it's own, she will not use illicit drugs. She is not protected against Hepatitis C infection if she using IV or intranasal drugs again. She will continue with vaccination series for Hepatitis A as recommended. Her fibrosis score was previously F0, she does not require further liver monitoring at this time, liver enzymes are now normal. Copies of her labs were given to her today.       Return if symptoms worsen or fail to improve.      Electronically signed 6/3/2020 10:44  Zofia Wray PA-C, [facility]